# Patient Record
(demographics unavailable — no encounter records)

---

## 2025-07-22 NOTE — ASSESSMENT
[FreeTextEntry1] : all lab data was reviewed with patient in detail from 6/25/2025 38 yo woman with CKD 2, elevated BP readings, albuminuria in setting of solitary kidney s/p R nephrectomy age 8. -elevated BP readings- goal is -130/75-80 measure BP every S, M, T for next few eeks- forward readings to office  limit salt already exercising regularly and weight at goal does have some stress and anxiety in regard to divorce  may adjustment/addition of meds if BP above target -albuminuria- ACR 40- new - trend consider RASi if persists especially if BP above target may have taken NSAID around time of lab draw repeat 4-6 weeks then f/u OV -CKD 2- as above consider RASi, limiting protein intake avoid NSAIDs

## 2025-07-22 NOTE — HISTORY OF PRESENT ILLNESS
[FreeTextEntry1] : 34 yo woman for follow up evaluation of CKD 1, solitary kidney, s/p R nephrectomy age 8. going through a divorce- does have a 14 month old daughter-  divorce is causing some stress but feels that she is coping on lexapro 15 mg No h/o DM or HTN offers that BPs can run > 130 at times and > 80 diastolic at times occ NSAID use- not frequently mostly as needed during menses Denies flank pain, dysuria hematuria or frothy urine.  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 Had left ureter reimplantation, then right nephrectomy age 8- as dysplastic, ectopic and patient with constant wetting

## 2025-07-22 NOTE — REASON FOR VISIT
[Home] : at home, [unfilled] , at the time of the visit. [Medical Office: ([facility])___] : at the medical office located in  [Telehealth (audio & video)] : This visit was provided via telehealth using real-time 2-way audio visual technology. [Verbal consent obtained from patient] : the patient, [unfilled] [Initial Evaluation] : an initial evaluation [FreeTextEntry1] : CKD 1, solitary kidney